# Patient Record
Sex: MALE | Race: WHITE | NOT HISPANIC OR LATINO | Employment: PART TIME | URBAN - METROPOLITAN AREA
[De-identification: names, ages, dates, MRNs, and addresses within clinical notes are randomized per-mention and may not be internally consistent; named-entity substitution may affect disease eponyms.]

---
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
## 2023-02-02 DIAGNOSIS — B00.1 RECURRENT COLD SORES: ICD-10-CM

## 2023-02-02 NOTE — TELEPHONE ENCOUNTER
Called pt's mom and advised of below.  She will have pt call or send  message in to request refill.    Rochelle Morton RN, BSN  [facility]

## 2023-02-02 NOTE — TELEPHONE ENCOUNTER
If there is nothing on file to communicate about this patient then we really can't do so.  Please let her know that he will need to request this medication himself- either call or send Cennox message or they need to have permission to communicate on file to go thru his mom.  I removed myself as PCP as I have not seen him since 2019 and has seen 4 FP providers since then.

## 2023-02-02 NOTE — TELEPHONE ENCOUNTER
Routed refill request to last provider seen as [facility] not pcp.    Last refill 4/15/2019.    Routed to [place].    Shanon Massey RN

## 2023-02-02 NOTE — TELEPHONE ENCOUNTER
Patient's mom called asking for a new prescription for valacyclovir as it has been 2 years since it was last written. She requests that the patient's PCP writes a new script for this medication and they would like it sent to [facility] in [place] on [address].     As far as I can see there is no C2C on file for the patient's mother. She provided all of the above information. She requested a call back when this is done.    PATIENT Phone: [phone]  Mom Phone: [phone]  OK to leave a detailed message    Aurora Sanchez

## 2023-02-03 RX ORDER — VALACYCLOVIR HYDROCHLORIDE 1 G/1
TABLET, FILM COATED ORAL
Qty: 4 TABLET | Refills: 1 | Status: SHIPPED | OUTPATIENT
Start: 2023-02-03

## 2023-06-26 ENCOUNTER — VIRTUAL VISIT (OUTPATIENT)
Dept: FAMILY MEDICINE | Facility: CLINIC | Age: 18
End: 2023-06-26
Payer: COMMERCIAL

## 2023-06-26 DIAGNOSIS — L42 PITYRIASIS ROSEA: Primary | ICD-10-CM

## 2023-06-26 PROCEDURE — 99214 OFFICE O/P EST MOD 30 MIN: CPT | Mod: VID | Performed by: FAMILY MEDICINE

## 2023-06-26 ASSESSMENT — ENCOUNTER SYMPTOMS
FATIGUE: 0
PHOTOPHOBIA: 0
MYALGIAS: 0
HEADACHES: 0
PALPITATIONS: 0
FEVER: 0
ARTHRALGIAS: 0

## 2023-06-26 NOTE — PROGRESS NOTES
Lamont is a 17 year old who is being evaluated via a billable video visit.      How would you like to obtain your AVS? MyChart  If the video visit is dropped, the invitation should be resent by: Text to cell phone: [phone]  Will anyone else be joining your video visit? No    Assessment and Plan    (L42) Pityriasis rosea  (primary encounter diagnosis)  Comment: mild, does look prettytypical for pityriasis, at least as far as I can tell over video.  Discussed that rash is not c/w Lyme, and has no other concerning symptoms.  Moreover, Lyme test would not be reliable given previous infection.  Plan: REVIEW OF HEALTH MAINTENANCE PROTOCOL ORDERS              RTC in 2w prn    Prieto Rogers MD      Subjective   Lamont is a 17 year old, presenting for the following health issues:  Rash and Consult For (Lyme disease testing)        6/26/2023    11:34 AM   Additional Questions   Roomed by RADHA Rincon     History of Present Illness       Reason for visit:  Rash  Symptom onset:  1-2 weeks ago  Symptoms include:  Some itchy spots. Some fatigue.  Symptom intensity:  Mild  Symptom progression:  Staying the same  Had these symptoms before:  No  What makes it worse:  No  What makes it better:  No    He eats 2-3 servings of fruits and vegetables daily.He consumes 2 sweetened beverage(s) daily.He exercises with enough effort to increase his heart rate 30 to 60 minutes per day.  He exercises with enough effort to increase his heart rate 4 days per week.   He is taking medications regularly.     Weird rash from neck to hips.  Present for about 2 weeks.  Rash largely not bothersome, some are itchy, most are not.  Started with 2 bumps, gradually spreading.  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  Also several trips to [employer].  In the two weeks has found two ticks on him, one attached to the side of his neck.      No fever, HA, photophobia, no joint pain, perhaps some muscle pain but is an avid athlete and works out most days.  Doesn't feel  like anything unusual.  No CP, palpitations, RAYO.      History if Lyme, mom was wanting him to get tested.      Review of Systems   Constitutional: Negative for fatigue and fever.   Eyes: Negative for photophobia.   Cardiovascular: Negative for chest pain and palpitations.   Musculoskeletal: Negative for arthralgias and myalgias.   Skin: Positive for rash.   Neurological: Negative for headaches.            Objective           Vitals:  No vitals were obtained today due to virtual visit.    Physical Exam  Skin:     Comments: Pink, macular rash.  Ovoid lesions, generally oriented diagonally on trunk        GENERAL: Healthy, alert and no distress  EYES: Eyes grossly normal to inspection.  No discharge or erythema, or obvious scleral/conjunctival abnormalities.  RESP: No audible wheeze, cough, or visible cyanosis.  No visible retractions or increased work of breathing.    SKIN: Visible skin clear. No significant rash, abnormal pigmentation or lesions.  NEURO: Cranial nerves grossly intact.  Mentation and speech appropriate for age.  PSYCH: Mentation appears normal, affect normal/bright, judgement and insight intact, normal speech and appearance well-groomed.                Video-Visit Details    Type of service:  Video Visit     Originating Location (pt. Location): Home  Distant Location (provider location):  Off-site  Platform used for Video Visit: The Rounds

## 2023-09-17 ENCOUNTER — HEALTH MAINTENANCE LETTER (OUTPATIENT)
Age: 18
End: 2023-09-17

## 2024-01-08 ENCOUNTER — MYC REFILL (OUTPATIENT)
Dept: PEDIATRICS | Facility: CLINIC | Age: 19
End: 2024-01-08

## 2024-01-08 DIAGNOSIS — B00.1 RECURRENT COLD SORES: ICD-10-CM

## 2024-01-08 RX ORDER — VALACYCLOVIR HYDROCHLORIDE 1 G/1
TABLET, FILM COATED ORAL
Qty: 4 TABLET | Refills: 1 | OUTPATIENT
Start: 2024-01-08

## 2024-01-08 NOTE — TELEPHONE ENCOUNTER
This was last addressed in 2019 by previous PCP ([facility]).  Would advise an evisit.    Jamia Huff CNP

## 2024-01-09 RX ORDER — VALACYCLOVIR HYDROCHLORIDE 1 G/1
TABLET, FILM COATED ORAL
Qty: 4 TABLET | Refills: 1 | OUTPATIENT
Start: 2024-01-09

## 2024-01-09 NOTE — TELEPHONE ENCOUNTER
Brief chart review.    Have not seen patient in 1.5 years. Last prescription from 2019. Needs appt for refill.    Marlon Carrero MD  [facility]  1/9/2024

## 2024-04-13 ENCOUNTER — OFFICE VISIT (OUTPATIENT)
Dept: URGENT CARE | Facility: URGENT CARE | Age: 19
End: 2024-04-13
Payer: COMMERCIAL

## 2024-04-13 VITALS
SYSTOLIC BLOOD PRESSURE: 117 MMHG | BODY MASS INDEX: 23.63 KG/M2 | HEART RATE: 69 BPM | OXYGEN SATURATION: 100 % | RESPIRATION RATE: 16 BRPM | DIASTOLIC BLOOD PRESSURE: 80 MMHG | WEIGHT: 191.6 LBS | TEMPERATURE: 97.6 F

## 2024-04-13 DIAGNOSIS — H61.21 IMPACTED CERUMEN OF RIGHT EAR: ICD-10-CM

## 2024-04-13 DIAGNOSIS — H65.91 OME (OTITIS MEDIA WITH EFFUSION), RIGHT: Primary | ICD-10-CM

## 2024-04-13 PROCEDURE — 99213 OFFICE O/P EST LOW 20 MIN: CPT | Mod: 25 | Performed by: PHYSICIAN ASSISTANT

## 2024-04-13 PROCEDURE — 69209 REMOVE IMPACTED EAR WAX UNI: CPT | Mod: RT | Performed by: PHYSICIAN ASSISTANT

## 2024-04-13 RX ORDER — AMOXICILLIN 875 MG
875 TABLET ORAL 2 TIMES DAILY
Qty: 14 TABLET | Refills: 0 | Status: SHIPPED | OUTPATIENT
Start: 2024-04-13

## 2024-04-13 ASSESSMENT — PAIN SCALES - GENERAL: PAINLEVEL: MODERATE PAIN (4)

## 2024-04-13 NOTE — PROGRESS NOTES
Assessment & Plan     OME (otitis media with effusion), right  Patient presents with ear infection. Antibiotic prescribed and side effects discussed. Advised to take full course of antibiotics. Take OTC pain relievers a needed. Symptoms should improve over the next 1-2 days. Follow up in clinic as needed  - amoxicillin (AMOXIL) 875 MG tablet; Take 1 tablet (875 mg) by mouth 2 times daily    Impacted cerumen of right ear  Patient tolerated procedure well  - OH REMOVAL IMPACTED CERUMEN IRRIGATION/LVG UNILAT              No follow-ups on file.    Subjective   Lamont is a 18 year old, presenting for the following health issues:  Otalgia (Right ear pain beginning Tuesday. Ear pain worsened last night.)    HPI       Acute Illness  Acute illness concerns: ear pain - right  Onset/Duration: 1 week  Symptoms:  Fever: No  Chills/Sweats: No  Headache (location?): No  Sinus Pressure: No  Conjunctivitis:  No  Ear Pain: YES: right - feels blocked and hard to hear over the last 24 hrs  Rhinorrhea: No  Congestion: No  Sore Throat: No - jaw pain on the right side  Cough: no  Wheeze: No  Decreased Appetite: No  Nausea: No  Vomiting: No  Diarrhea: No  Dysuria/Freq.: No  Dysuria or Hematuria: No  Fatigue/Achiness: No  Sick/Strep Exposure: No  Therapies tried and outcome: ibuprofen, and otc swimmer ear drops        Review of Systems  Constitutional, HEENT, cardiovascular, pulmonary, gi and gu systems are negative, except as otherwise noted.      Objective    /80 (BP Location: Left arm, Patient Position: Sitting, Cuff Size: Adult Large)   Pulse 69   Temp 97.6  F (36.4  C) (Tympanic)   Resp 16   Wt 86.9 kg (191 lb 9.6 oz)   SpO2 100%   BMI 23.63 kg/m    Body mass index is 23.63 kg/m .  Physical Exam   GENERAL: alert and no distress  HENT: normal cephalic/atraumatic, right ear: erythematous, bulging membrane, occluded with wax, and after removal was able to visualize better, and left ear: normal: no effusions, no erythema, normal  landmarks  NECK: no adenopathy, no asymmetry, masses, or scars  RESP: lungs clear to auscultation - no rales, rhonchi or wheezes  CV: regular rate and rhythm, normal S1 S2, no S3 or S4, no murmur, click or rub, no peripheral edema            Signed Electronically by: EPHRAIM Betts

## 2024-04-15 ENCOUNTER — OFFICE VISIT (OUTPATIENT)
Dept: URGENT CARE | Facility: URGENT CARE | Age: 19
End: 2024-04-15
Payer: COMMERCIAL

## 2024-04-15 VITALS
HEIGHT: 75 IN | WEIGHT: 190 LBS | TEMPERATURE: 98.8 F | HEART RATE: 84 BPM | DIASTOLIC BLOOD PRESSURE: 78 MMHG | RESPIRATION RATE: 16 BRPM | BODY MASS INDEX: 23.62 KG/M2 | OXYGEN SATURATION: 98 % | SYSTOLIC BLOOD PRESSURE: 122 MMHG

## 2024-04-15 DIAGNOSIS — H65.91 OME (OTITIS MEDIA WITH EFFUSION), RIGHT: ICD-10-CM

## 2024-04-15 DIAGNOSIS — H60.391 INFECTIVE OTITIS EXTERNA, RIGHT: Primary | ICD-10-CM

## 2024-04-15 PROCEDURE — 99213 OFFICE O/P EST LOW 20 MIN: CPT | Performed by: FAMILY MEDICINE

## 2024-04-15 RX ORDER — PREDNISONE 20 MG/1
40 TABLET ORAL DAILY
Qty: 10 TABLET | Refills: 0 | Status: SHIPPED | OUTPATIENT
Start: 2024-04-15 | End: 2024-04-20

## 2024-04-15 NOTE — PROGRESS NOTES
"SUBJECTIVE:  Chief Complaint   Patient presents with    Urgent Care     Right ear pain x 3 days, was off and on for about a month. Went in Friday   diagnosed with ear infection , given an antibiotic . Woke up today with fever 99.0 , pain worse this morning.      Nicola Alcantara is a 18 year old male who presents with a chief complaint of right ear pain.    Seen at [facility] - diagnosed with right cerumen impaction and post irrigated, right OM - RX Amoxicillin given.    No recurrent ear infection    Worsening symptoms, has been taking ibuprofen 400 mg three times a day but not helping.  Endorsed some drainage.    Developed low grade fever.      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    EXAM:   /78   Pulse 84   Temp 98.8  F (37.1  C) (Tympanic)   Resp 16   Ht 1.905 m (6' 3\")   Wt 86.2 kg (190 lb)   SpO2 98%   BMI 23.75 kg/m    GENERAL APPEARANCE: healthy, alert and no distress  EARS: left ear canal and TM normal, right ear canal moderately swollen, faint pink, unable to visualize TM due to ear canal swelling.  PSYCH:alert, affect bright    ASSESSMENT/PLAN:  (H60.391) Infective otitis externa, right  (primary encounter diagnosis)  Plan: amoxicillin-clavulanate (AUGMENTIN) 875-125 MG         tablet, predniSONE (DELTASONE) 20 MG tablet            (H65.91) OME " (otitis media with effusion), right  Plan: amoxicillin-clavulanate (AUGMENTIN) 875-125 MG         tablet            Reviewed that post irrigation that usually ear canal may be more irritated but should have improved instead of worsening.  Due to increase in symptoms, will switch antibiotic - new RX Augmentin given for treatment of otitis externa and right OM.  RX prednisone burst given due to ear canal swelling, would be challenging for ear drops.  Reviewed appropriate dosing for tylenol and ibuprofen.    Follow up with primary provider if no improvement of symptoms in 1 week    Poli Yao MD  April 15, 2024 11:22 AM

## 2024-04-15 NOTE — PATIENT INSTRUCTIONS
Stop Amoxicillin  Start Augmentin  Take full course of prednisone burst due to swelling in ear canal

## 2024-04-23 ENCOUNTER — TELEPHONE (OUTPATIENT)
Dept: FAMILY MEDICINE | Facility: CLINIC | Age: 19
End: 2024-04-23

## 2024-04-23 NOTE — CONFIDENTIAL NOTE
Patient Quality Outreach    Patient is due for the following:   Physical Preventive Adult Physical    Next Steps:   Schedule a Adult Preventative    Type of outreach:    Sent A[employer] message.      Questions for provider review:    None           Onel Ribeiro MA

## 2024-04-23 NOTE — LETTER
[facility]  [address] [address] [phone]       May 7, 2024    Nicola Alcantara  [address] [address]    Dear Lamont,    We care about your health and have reviewed your health plan and are making recommendations based on this review, to optimize your health.    You are in particular need of attention regarding:  -Wellness (Physical) Visit     We are recommending that you:  -schedule a WELLNESS (Physical) APPOINTMENT with me.   I will check fasting labs the same day - nothing to eat except water and meds for 8-10 hours prior.      In addition, here is a list of due or overdue Health Maintenance reminders.    Health Maintenance Due   Topic Date Due    Yearly Preventive Visit  07/15/2023    COVID-19 Vaccine (5 - 2023-24 season) 09/01/2023    PHQ-2 (once per calendar year)  01/01/2024       To address the above recommendations, we encourage you to contact us at [phone], via Vitasol or by contacting Central Scheduling toll free at [phone] 24 hours a day. They will assist you with finding the most convenient time and location.    Thank you for trusting [facility] and we appreciate the opportunity to serve you.  We look forward to supporting your healthcare needs in the future.    Healthy Regards,    Your [facility] Team

## 2024-05-07 NOTE — CONFIDENTIAL NOTE
Patient Quality Outreach    Patient is due for the following:   Physical Preventive Adult Physical    Next Steps:   Schedule a Adult Preventative    Type of outreach:    Sent letter.    Next Steps:  Reach out within 90 days via Letter.    Max number of attempts reached: No. Will try again in 90 days if patient still on fail list.    Questions for provider review:    None           Onel Ribeiro MA

## 2024-05-24 ENCOUNTER — VIRTUAL VISIT (OUTPATIENT)
Dept: PEDIATRICS | Facility: CLINIC | Age: 19
End: 2024-05-24
Payer: COMMERCIAL

## 2024-05-24 DIAGNOSIS — R53.81 MALAISE: Primary | ICD-10-CM

## 2024-05-24 PROCEDURE — 99441 PR PHYSICIAN TELEPHONE EVALUATION 5-10 MIN: CPT | Mod: 93 | Performed by: INTERNAL MEDICINE

## 2024-05-24 NOTE — PROGRESS NOTES
"Lamont is a 18 year old who is being evaluated via a billable telephone visit.      Originating Location (pt. Location): Home    Distant Location (provider location):  Off-site    Assessment & Plan     Malaise  Pt presenting with fever, headache, muscle aches, and fatigue x 3 days.  Only ever felt this way in the past when he was diagnosed with lyme disease.  Of note, he has been working in long grass and has removed a few ticks, none of which were deer ticks (he is sure).  He has been careful to check for ticks and is well educated on how to recognize deer ticks.  He reports he has not developed any EM-like rash (which he did have with his first presentation of lyme in 2017).    Given the unlikely chance that an unnoticed deer tick was attached for > 24 hours and the lack of EM rash, I discussed that a lyme diagnosis is unlikely.  Unfortunately serum testing is not as helpful in his situation given that IgM titers can remain elevated for years, leading to false positive test results.    I suspect his symptoms are more likely viral and self-limited in nature and I advised him to monitor symptoms and return if anything changes or if he does not improve.      See Patient Instructions    Subjective   Lamont is a 18 year old, presenting for the following health issues:  No chief complaint on file.    History of Present Illness       Headaches:   Since the patient's last clinic visit, headaches are: improved  The patient is getting headaches:  Once a month  He is not able to do normal daily activities when he has a migraine.  The patient is taking the following rescue/relief medications:  Ibuprofen (Advil, Motrin)   Patient states \"The relief is inconsistent\" from the rescue/relief medications.   The patient is taking the following medications to prevent migraines:  No medications to prevent migraines  In the past 4 weeks, the patient has gone to an Urgent Care or Emergency Room 0 times times due to headaches.    Reason for " visit:  Unknown illness  Symptom onset:  1-3 days ago  Symptoms include:  Body aches, fatigue, headache/eye aches, neck and back pain,  Symptom intensity:  Moderate  Symptom progression:  Improving  Had these symptoms before:  Yes  Has tried/received treatment for these symptoms:  Yes  Previous treatment was successful:  Yes  Prior treatment description:  Antibiotics  What makes it worse:  Work  What makes it better:  Sleep    He eats 2-3 servings of fruits and vegetables daily.He consumes 1 sweetened beverage(s) daily.He exercises with enough effort to increase his heart rate 30 to 60 minutes per day.  He exercises with enough effort to increase his heart rate 5 days per week.   He is taking medications regularly.       Working outdoors in [place].  Ticks and mosquitos around.  Started with fatigue.  Then headache came on yesterday and advil helped.  Getting energy back.  Headaches and body aches and fatigue.  Ongoing for about 3-4 days.  Starting to feel better.  Associated with fever, no cough or sore throat.  Runny/stuffy nose due to seasonal allergies.    No rash.            All other systems on a 10-point review are negative, unless otherwise noted in HPI        Objective           Vitals:  No vitals were obtained today due to virtual visit.    Physical Exam   General: Alert and no distress //Respiratory: No audible wheeze, cough, or shortness of breath // Psychiatric:  Appropriate affect, tone, and pace of words            Phone call duration: 9 minutes  Signed Electronically by: Tanya Waite MD

## 2024-08-05 NOTE — CONFIDENTIAL NOTE
Patient Quality Outreach    Patient is due for the following:   Physical Preventive Adult Physical    Next Steps:   Schedule a Adult Preventative    Type of outreach:    Sent letter.      Questions for provider review:    None           Onel Ribeiro MA

## 2024-11-09 ENCOUNTER — HEALTH MAINTENANCE LETTER (OUTPATIENT)
Age: 19
End: 2024-11-09

## 2024-11-14 SDOH — HEALTH STABILITY: PHYSICAL HEALTH: ON AVERAGE, HOW MANY DAYS PER WEEK DO YOU ENGAGE IN MODERATE TO STRENUOUS EXERCISE (LIKE A BRISK WALK)?: 3 DAYS

## 2024-11-14 SDOH — HEALTH STABILITY: PHYSICAL HEALTH: ON AVERAGE, HOW MANY MINUTES DO YOU ENGAGE IN EXERCISE AT THIS LEVEL?: 60 MIN

## 2024-11-14 ASSESSMENT — SOCIAL DETERMINANTS OF HEALTH (SDOH): HOW OFTEN DO YOU GET TOGETHER WITH FRIENDS OR RELATIVES?: MORE THAN THREE TIMES A WEEK

## 2024-11-15 ENCOUNTER — OFFICE VISIT (OUTPATIENT)
Dept: PEDIATRICS | Facility: CLINIC | Age: 19
End: 2024-11-15
Payer: COMMERCIAL

## 2024-11-15 VITALS
HEIGHT: 75 IN | RESPIRATION RATE: 16 BRPM | TEMPERATURE: 98 F | OXYGEN SATURATION: 100 % | HEART RATE: 81 BPM | SYSTOLIC BLOOD PRESSURE: 123 MMHG | WEIGHT: 204 LBS | DIASTOLIC BLOOD PRESSURE: 81 MMHG | BODY MASS INDEX: 25.36 KG/M2

## 2024-11-15 DIAGNOSIS — R05.9 COUGH, UNSPECIFIED TYPE: ICD-10-CM

## 2024-11-15 DIAGNOSIS — R21 RASH: ICD-10-CM

## 2024-11-15 DIAGNOSIS — Z00.00 HEALTHCARE MAINTENANCE: Primary | ICD-10-CM

## 2024-11-15 PROBLEM — A69.20 LYME DISEASE: Status: RESOLVED | Noted: 2017-08-13 | Resolved: 2024-11-15

## 2024-11-15 RX ORDER — AZITHROMYCIN 250 MG/1
TABLET, FILM COATED ORAL
Qty: 6 TABLET | Refills: 0 | Status: SHIPPED | OUTPATIENT
Start: 2024-11-15 | End: 2024-11-20

## 2024-11-15 ASSESSMENT — PAIN SCALES - GENERAL: PAINLEVEL_OUTOF10: NO PAIN (0)

## 2024-11-15 NOTE — PROGRESS NOTES
"Preventive Care Visit  [facility] KOREY JOHNSON CNP, Family Medicine  Nov 15, 2024      Assessment & Plan     Healthcare maintenance  - REVIEW OF HEALTH MAINTENANCE PROTOCOL ORDERS  - TDAP 10-64Y (ADACEL,BOOSTRIX)  - INFLUENZA VACCINE,SPLIT VIRUS,TRIVALENT,PF(FLUZONE)    Cough, unspecified type  Ongoing cough x 2 weeks with minimal URI sx, not improving. Suspect viral cough vs mycoplasma. Lungs are clear. However, given prolonged cough and high prevalence this season of mycoplasma pneumonia, I would like to treat him to cover this  - azithromycin (ZITHROMAX) 250 MG tablet; Take 2 tablets (500 mg) by mouth daily for 1 day, THEN 1 tablet (250 mg) daily for 4 days.  -Discussed supportive cares and reasons to return    Rash  Small urticarial rash over entire torso. Suspect this is related to mycoplasma or possibly viral cough.   -Let us know if not improving  -Discussed supportive cares and reasons to return            BMI  Estimated body mass index is 25.61 kg/m  as calculated from the following:    Height as of this encounter: 1.901 m (6' 2.84\").    Weight as of this encounter: 92.5 kg (204 lb).   Weight management plan: Discussed healthy diet and exercise guidelines    Counseling  Appropriate preventive services were addressed with this patient via screening, questionnaire, or discussion as appropriate for fall prevention, nutrition, physical activity, Tobacco-use cessation, social engagement, weight loss and cognition.  Checklist reviewing preventive services available has been given to the patient.  Reviewed patient's diet, addressing concerns and/or questions.   He is at risk for lack of exercise and has been provided with information to increase physical activity for the benefit of his well-being.           Subjective   Lamont is a 19 year old, presenting for the following:  Physical        11/15/2024     6:56 AM   Additional Questions   Roomed by [facility]HS   Accompanied by self    "       HPI          Health Care Directive  Patient does not have a Health Care Directive: Discussed advance care planning with patient; however, patient declined at this time.      11/14/2024   General Health   How would you rate your overall physical health? Good   Feel stress (tense, anxious, or unable to sleep) Only a little      (!) STRESS CONCERN      11/14/2024   Nutrition   Three or more servings of calcium each day? Yes   Diet: Regular (no restrictions)   How many servings of fruit and vegetables per day? (!) 0-1   How many sweetened beverages each day? 0-1            11/14/2024   Exercise   Days per week of moderate/strenous exercise 3 days   Average minutes spent exercising at this level 60 min            11/14/2024   Social Factors   Frequency of gathering with friends or relatives More than three times a week   Worry food won't last until get money to buy more No   Food not last or not have enough money for food? No   Do you have housing? (Housing is defined as stable permanent housing and does not include staying ouside in a car, in a tent, in an abandoned building, in an overnight shelter, or couch-surfing.) Yes   Are you worried about losing your housing? No   Lack of transportation? No   Unable to get utilities (heat,electricity)? No            11/14/2024   Dental   Dentist two times every year? Yes            11/14/2024   TB Screening   Were you born outside of the [place]? No         11/14/2024   Substance Use   Alcohol more than 3/day or more than 7/wk No   Do you use any other substances recreationally? No        Social History     Tobacco Use    Smoking status: Never     Passive exposure: Never    Smokeless tobacco: Never   Vaping Use    Vaping status: Never Used   Substance Use Topics    Alcohol use: Yes    Drug use: No           11/14/2024   STI Screening   New sexual partner(s) since last STI/HIV test? No            11/14/2024   Contraception/Family Planning   Questions about contraception or  "family planning No           Reviewed and updated as needed this visit by Provider                          Review of Systems  Constitutional, neuro, ENT, endocrine, pulmonary, cardiac, gastrointestinal, genitourinary, musculoskeletal, integument and psychiatric systems are negative, except as otherwise noted.     Objective    Exam  /81 (BP Location: Right arm, Patient Position: Sitting, Cuff Size: Adult Large)   Pulse 81   Temp 98  F (36.7  C) (Tympanic)   Resp 16   Ht 1.901 m (6' 2.84\")   Wt 92.5 kg (204 lb)   SpO2 100%   BMI 25.61 kg/m     Estimated body mass index is 25.61 kg/m  as calculated from the following:    Height as of this encounter: 1.901 m (6' 2.84\").    Weight as of this encounter: 92.5 kg (204 lb).    Physical Exam  GENERAL: alert and no distress  EYES: Eyes grossly normal to inspection, PERRL and conjunctivae and sclerae normal  HENT: ear canals and TM's normal, nose and mouth without ulcers or lesions  NECK: no adenopathy, no asymmetry, masses, or scars  RESP: Dry cough. lungs clear to auscultation - no rales, rhonchi or wheezes  CV: regular rate and rhythm, normal S1 S2, no S3 or S4, no murmur, click or rub, no peripheral edema  ABDOMEN: soft, nontender, no hepatosplenomegaly, no masses and bowel sounds normal  MS: no gross musculoskeletal defects noted, no edema  SKIN: Small hives (not target lesions) over entire torso, concentrated lower ribs. no other suspicious lesions or rashes  NEURO: Normal strength and tone, mentation intact and speech normal  PSYCH: mentation appears normal, affect normal/bright        Signed Electronically by: KOREY VAZ CNP    "

## 2025-01-04 ENCOUNTER — E-VISIT (OUTPATIENT)
Dept: PEDIATRICS | Facility: CLINIC | Age: 20
End: 2025-01-04
Payer: COMMERCIAL

## 2025-01-04 DIAGNOSIS — B00.1 RECURRENT COLD SORES: ICD-10-CM

## 2025-01-04 PROCEDURE — 99207 PR NON-BILLABLE SERV PER CHARTING: CPT | Performed by: NURSE PRACTITIONER

## 2025-01-14 RX ORDER — VALACYCLOVIR HYDROCHLORIDE 1 G/1
TABLET, FILM COATED ORAL
Qty: 12 TABLET | Refills: 1 | Status: SHIPPED | OUTPATIENT
Start: 2025-01-14

## 2025-05-12 ENCOUNTER — TELEPHONE (OUTPATIENT)
Dept: FAMILY MEDICINE | Facility: CLINIC | Age: 20
End: 2025-05-12

## 2025-05-12 ENCOUNTER — OFFICE VISIT (OUTPATIENT)
Dept: FAMILY MEDICINE | Facility: CLINIC | Age: 20
End: 2025-05-12
Payer: COMMERCIAL

## 2025-05-12 VITALS
HEIGHT: 75 IN | RESPIRATION RATE: 14 BRPM | OXYGEN SATURATION: 100 % | DIASTOLIC BLOOD PRESSURE: 76 MMHG | HEART RATE: 95 BPM | WEIGHT: 208.8 LBS | SYSTOLIC BLOOD PRESSURE: 131 MMHG | TEMPERATURE: 98.7 F | BODY MASS INDEX: 25.96 KG/M2

## 2025-05-12 DIAGNOSIS — K64.9 HEMORRHOIDS, UNSPECIFIED HEMORRHOID TYPE: Primary | ICD-10-CM

## 2025-05-12 PROCEDURE — 3075F SYST BP GE 130 - 139MM HG: CPT | Performed by: NURSE PRACTITIONER

## 2025-05-12 PROCEDURE — 99213 OFFICE O/P EST LOW 20 MIN: CPT | Performed by: NURSE PRACTITIONER

## 2025-05-12 PROCEDURE — 1126F AMNT PAIN NOTED NONE PRSNT: CPT | Performed by: NURSE PRACTITIONER

## 2025-05-12 PROCEDURE — 3078F DIAST BP <80 MM HG: CPT | Performed by: NURSE PRACTITIONER

## 2025-05-12 RX ORDER — HYDROCORTISONE ACETATE 25 MG/1
25 SUPPOSITORY RECTAL 2 TIMES DAILY PRN
Qty: 30 SUPPOSITORY | Refills: 2 | Status: SHIPPED | OUTPATIENT
Start: 2025-05-12

## 2025-05-12 RX ORDER — HYDROCORTISONE ACETATE 25 MG/1
25 SUPPOSITORY RECTAL 2 TIMES DAILY PRN
Qty: 30 SUPPOSITORY | Refills: 2 | Status: SHIPPED | OUTPATIENT
Start: 2025-05-12 | End: 2025-05-12

## 2025-05-12 ASSESSMENT — PAIN SCALES - GENERAL: PAINLEVEL_OUTOF10: NO PAIN (0)

## 2025-05-12 NOTE — PROGRESS NOTES
"    Subjective   Lamont is a 19 year old, presenting for the following health issues:  Rectal Problem (On and off blood in stool and pain during bowel movement. Pt states it has been on and off for a year. Pt denies family hx of colon cancer but both parents have had hemorrhoids at a young age. Pt states he has been lifting weights and was worried that was causing the bleeding. Pt has tried preparation H once and noticed less blood but still pain. No fiber supplements. )        5/12/2025     8:16 AM   Additional Questions   Roomed by Gloria TERRY CMA     History of Present Illness       Reason for visit:  On and off pain and bleeding when passing stools. Increased irritation/itching around anal area.  Symptom onset:  More than a month  Symptoms include:  Pain, bleeding, itching  Symptom intensity:  Moderate  Symptom progression:  Worsening  Had these symptoms before:  Yes  Has tried/received treatment for these symptoms:  No  What makes it worse:  No  What makes it better:  No   He is taking medications regularly.          Hemorrhoids  Onset/Duration: x 1 year  Description:   Michelle-anal lump: No  Pain: YES  Itching: YES  Accompanying Signs & Symptoms:  Blood in stool: YES  Changes in stool pattern: No  History:   Any previous GI studies done:none  Family History of colon cancer: No  Precipitating factors:   None  Alleviating factors:  None  Therapies tried and outcome: increased fiber in diet and preparation H          Objective    /76 (BP Location: Left arm, Patient Position: Sitting, Cuff Size: Adult Regular)   Pulse 95   Temp 98.7  F (37.1  C) (Oral)   Resp 14   Ht 1.892 m (6' 2.5\")   Wt 94.7 kg (208 lb 12.8 oz)   SpO2 100%   BMI 26.45 kg/m    Body mass index is 26.45 kg/m .  Physical Exam   GENERAL: alert and no distress  RESP: lungs clear to auscultation - no rales, rhonchi or wheezes  CV: regular rate and rhythm, normal S1 S2, no S3 or S4, no murmur, click or rub, no peripheral edema   ABDOMEN: soft, " nontender, no hepatosplenomegaly, no masses and bowel sounds normal  RECTAL (male): normal sphincter tone and prostate of normal size for age, smooth, nontender without masses/nodules.  Approx 1.5 cm fluctant lump noted on rectal digital exam- feels consistent with internal hemorrhoid    A/P:  1. Hemorrhoids, unspecified hemorrhoid type (Primary)  - hydrocortisone (ANUSOL-HC) 25 MG suppository; Place 1 suppository (25 mg) rectally 2 times daily as needed for hemorrhoids.  Dispense: 30 suppository; Refill: 2          Signed Electronically by: Dana York, CNP